# Patient Record
Sex: FEMALE | Race: WHITE | NOT HISPANIC OR LATINO
[De-identification: names, ages, dates, MRNs, and addresses within clinical notes are randomized per-mention and may not be internally consistent; named-entity substitution may affect disease eponyms.]

---

## 2017-08-10 ENCOUNTER — HOSPITAL (OUTPATIENT)
Dept: OTHER | Age: 72
End: 2017-08-10
Attending: INTERNAL MEDICINE

## 2021-09-17 ENCOUNTER — OFFICE VISIT (OUTPATIENT)
Dept: URBAN - METROPOLITAN AREA CLINIC 43 | Facility: CLINIC | Age: 76
End: 2021-09-17
Payer: MEDICARE

## 2021-09-17 DIAGNOSIS — H43.813 VITREOUS DEGENERATION, BILATERAL: ICD-10-CM

## 2021-09-17 DIAGNOSIS — H18.523 EPITHELIAL (JUVENILE) CORNEAL DYSTROPHY, BILATERAL: ICD-10-CM

## 2021-09-17 DIAGNOSIS — H17.9 CORNEAL SCAR: ICD-10-CM

## 2021-09-17 DIAGNOSIS — H25.12 AGE-RELATED NUCLEAR CATARACT, LEFT EYE: Primary | ICD-10-CM

## 2021-09-17 PROCEDURE — 99204 OFFICE O/P NEW MOD 45 MIN: CPT | Performed by: OPTOMETRIST

## 2021-09-17 PROCEDURE — 92134 CPTRZ OPH DX IMG PST SGM RTA: CPT | Performed by: OPTOMETRIST

## 2021-09-17 ASSESSMENT — INTRAOCULAR PRESSURE
OD: 16
OS: 18

## 2021-09-17 ASSESSMENT — KERATOMETRY
OD: 43.00
OS: 43.13

## 2021-09-17 ASSESSMENT — VISUAL ACUITY
OD: 20/20
OS: 20/20

## 2021-09-17 NOTE — IMPRESSION/PLAN
Impression: Vitreous degeneration, bilateral: H43.813. Plan: Warning signs of retinal tear and detachment discussed with patient. To return to clinic STAT if any change in symptoms consistent with RT or RD. 
NO VMT on mac oCT

## 2021-09-17 NOTE — IMPRESSION/PLAN
Impression: Age-related nuclear cataract, left eye: H25.12. Plan: No treatment currently recommended due to level of vision. Patient will monitor vision changes and contact us with any decrease in vision, will re-evaluate cataract on return visit.

## 2021-09-17 NOTE — IMPRESSION/PLAN
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  Plan: significant, recommend ATs QID OU

## 2021-09-17 NOTE — IMPRESSION/PLAN
Impression: Primary open-angle glaucoma, bilateral, indeterminate stage: H40.1134. Plan: transferring care here
moderate cupping, disc heme OS today
taking lumigan qhs OU, dorzolamide BID OU
IOP 16/18 today
switch to rocklatan qhs OU, cont.  Cosopt BID OU
return 3-4 weeks for HVF/IOP/pachs/OCT RNFL

## 2021-11-01 ENCOUNTER — OFFICE VISIT (OUTPATIENT)
Dept: URBAN - METROPOLITAN AREA CLINIC 43 | Facility: CLINIC | Age: 76
End: 2021-11-01
Payer: MEDICARE

## 2021-11-01 DIAGNOSIS — H40.1134 PRIMARY OPEN-ANGLE GLAUCOMA, BILATERAL, INDETERMINATE STAGE: Primary | ICD-10-CM

## 2021-11-01 PROCEDURE — 76514 ECHO EXAM OF EYE THICKNESS: CPT | Performed by: OPTOMETRIST

## 2021-11-01 PROCEDURE — 92133 CPTRZD OPH DX IMG PST SGM ON: CPT | Performed by: OPTOMETRIST

## 2021-11-01 PROCEDURE — 99213 OFFICE O/P EST LOW 20 MIN: CPT | Performed by: OPTOMETRIST

## 2021-11-01 PROCEDURE — 92083 EXTENDED VISUAL FIELD XM: CPT | Performed by: OPTOMETRIST

## 2021-11-01 RX ORDER — DORZOLAMIDE HYDROCHLORIDE AND TIMOLOL MALEATE 20; 5 MG/ML; MG/ML
SOLUTION/ DROPS OPHTHALMIC
Qty: 5 | Refills: 6 | Status: INACTIVE
Start: 2021-11-01 | End: 2021-12-06

## 2021-11-01 ASSESSMENT — INTRAOCULAR PRESSURE
OS: 14
OD: 12
OS: 15
OD: 14

## 2021-11-01 NOTE — IMPRESSION/PLAN
Impression: Primary open-angle glaucoma, bilateral, moderate stage: S94.1372. Plan: transferring care here
moderate cupping, disc heme OS 09/17/21 IOP today: on rocklatan qhs OU, cosopt BID OU pachs slightly thin OCT RNFL today: OD: no thinning, OS: inf thinning HVF today: reliable OU: OD: paracentral/central defect, OS: paracentral/central defects, inf and sup nasal step
cont current gtts, return 6 weeks for IOP/HVF10-2

## 2021-11-01 NOTE — IMPRESSION/PLAN
Impression: Primary open-angle glaucoma, bilateral, indeterminate stage: H40.1134. Plan: see other plan

## 2021-12-06 ENCOUNTER — OFFICE VISIT (OUTPATIENT)
Dept: URBAN - METROPOLITAN AREA CLINIC 43 | Facility: CLINIC | Age: 76
End: 2021-12-06
Payer: MEDICARE

## 2021-12-06 DIAGNOSIS — H40.1132 PRIMARY OPEN-ANGLE GLAUCOMA, BILATERAL, MODERATE STAGE: Primary | ICD-10-CM

## 2021-12-06 PROCEDURE — 99213 OFFICE O/P EST LOW 20 MIN: CPT | Performed by: OPTOMETRIST

## 2021-12-06 PROCEDURE — 92083 EXTENDED VISUAL FIELD XM: CPT | Performed by: OPTOMETRIST

## 2021-12-06 RX ORDER — DORZOLAMIDE HYDROCHLORIDE AND TIMOLOL MALEATE 20; 5 MG/ML; MG/ML
SOLUTION/ DROPS OPHTHALMIC
Qty: 5 | Refills: 6 | Status: INACTIVE
Start: 2021-12-06 | End: 2022-04-04

## 2021-12-06 RX ORDER — NETARSUDIL AND LATANOPROST OPHTHALMIC SOLUTION, 0.02%/0.005% .2; .05 MG/ML; MG/ML
SOLUTION/ DROPS OPHTHALMIC; TOPICAL
Qty: 2.5 | Refills: 6 | Status: INACTIVE
Start: 2021-12-06 | End: 2021-12-06

## 2021-12-06 RX ORDER — NETARSUDIL AND LATANOPROST OPHTHALMIC SOLUTION, 0.02%/0.005% .2; .05 MG/ML; MG/ML
SOLUTION/ DROPS OPHTHALMIC; TOPICAL
Qty: 2.5 | Refills: 6 | Status: INACTIVE
Start: 2021-12-06 | End: 2022-04-04

## 2021-12-06 ASSESSMENT — INTRAOCULAR PRESSURE
OS: 14
OD: 14

## 2022-04-04 ENCOUNTER — OFFICE VISIT (OUTPATIENT)
Dept: URBAN - METROPOLITAN AREA CLINIC 43 | Facility: CLINIC | Age: 77
End: 2022-04-04
Payer: MEDICARE

## 2022-04-04 PROCEDURE — 99213 OFFICE O/P EST LOW 20 MIN: CPT | Performed by: OPTOMETRIST

## 2022-04-04 RX ORDER — DORZOLAMIDE HYDROCHLORIDE AND TIMOLOL MALEATE 20; 5 MG/ML; MG/ML
SOLUTION/ DROPS OPHTHALMIC
Qty: 5 | Refills: 6 | Status: ACTIVE
Start: 2022-04-04

## 2022-04-04 RX ORDER — NETARSUDIL AND LATANOPROST OPHTHALMIC SOLUTION, 0.02%/0.005% .2; .05 MG/ML; MG/ML
SOLUTION/ DROPS OPHTHALMIC; TOPICAL
Qty: 2.5 | Refills: 6 | Status: ACTIVE
Start: 2022-04-04

## 2022-04-04 ASSESSMENT — INTRAOCULAR PRESSURE
OD: 14
OS: 14
OD: 13

## 2022-04-04 NOTE — IMPRESSION/PLAN
Impression: Primary open-angle glaucoma, bilateral, moderate stage: E81.4667. Plan: moderate cupping, disc heme OS 09/17/21 IOP today: 13/14 on rocklatan qhs OU, cosopt BID OU pachs slightly thin OCT RNFL 11/01/21 OD: no thinning, OS: inf thinning HVF10-2 12/06/21: reliable OU, OD: paracentral defect and small sup arcuate, OS: superior arcuate with paracentral defect
cont current gtts RTC 5 months for CE/VF/RNFL/MAC

## 2022-04-20 ENCOUNTER — OFFICE VISIT (OUTPATIENT)
Dept: URBAN - METROPOLITAN AREA CLINIC 44 | Facility: CLINIC | Age: 77
End: 2022-04-20
Payer: MEDICARE

## 2022-04-20 DIAGNOSIS — H40.032 ANATOMICAL NARROW ANGLE, LEFT EYE: ICD-10-CM

## 2022-04-20 DIAGNOSIS — H40.1132 PRIMARY OPEN-ANGLE GLAUCOMA, BILATERAL, MODERATE STAGE: Primary | ICD-10-CM

## 2022-04-20 PROCEDURE — 99205 OFFICE O/P NEW HI 60 MIN: CPT | Performed by: OPHTHALMOLOGY

## 2022-04-20 PROCEDURE — 92133 CPTRZD OPH DX IMG PST SGM ON: CPT | Performed by: OPHTHALMOLOGY

## 2022-04-20 PROCEDURE — 76514 ECHO EXAM OF EYE THICKNESS: CPT | Performed by: OPHTHALMOLOGY

## 2022-04-20 PROCEDURE — 92020 GONIOSCOPY: CPT | Performed by: OPHTHALMOLOGY

## 2022-04-20 PROCEDURE — 92083 EXTENDED VISUAL FIELD XM: CPT | Performed by: OPHTHALMOLOGY

## 2022-04-20 ASSESSMENT — INTRAOCULAR PRESSURE
OS: 20
OD: 14

## 2022-04-20 NOTE — IMPRESSION/PLAN
Impression: Primary open-angle glaucoma, bilateral, moderate stage: G64.5518. Plan: PT HAS BEEN TREATED FOR GLAUCOMA OU     GONIO :  O=SS  OS= ANT TM (04/20/2022)        PACHS:  534 OU (04/20/2022) S/P CE IOL OD 12/2019/YAG 2020, THE PT HAS NOT HAD CATARACT SURGERY IN THE LEFT EYE
PT WAS REFERRED BY [facility] DUE TO VISUAL FIELD DEFECTS OU
PT HAS BEEN TREATED IN THE PAST BY DR PETERSEN
PT DENIES FAMILY HX OF GLAUCOMA
PT DENIES SULFA ALLERGY 
PT DENIES LUNG DZ 
TARGET IOP MID TEENS OR LESS 
RECOMMEND : 
1. CONTINUE ROCKLATAN OU QPM(STARTED BY DR [facility]) - PT WAS ORIGINALLY RXD LUMIGAN BY DR Favio Davis 2. CONTINUE COSOPT OU BID - STARTED BY DR Favio Davis 3. RECOMMEND LPI OS 4/20/22, PT DEFERS 4/20/22 4. ON 4/20/22 THE PT WAS PROVIDED LITERATURE ON LPI 5.  THE PT WISHES TO DEFER LPI OS OVER THE SUMMER AND RETURN IN THE FALL OF 2022 FOR LPI OS IF IT HAS NOT BEEN DONE WHILE SHE IS OUTSIDE OF [place] OVER THE SUMMER

## 2022-04-20 NOTE — IMPRESSION/PLAN
Impression: Anatomical narrow angle, left eye: H40.032. Plan: PT IS AT RISK FOR  NAG  OS      GONIO GRADE:  TM OS 04/20/2022 REFERRED BY DR. Rafa Rivera 1. AGREE WITH DR. Rafa Rivera THAT PT IS A RISK FOR ANGLE CLOSURE OS 2. RECOMMEND LPI OS TO AVOID ANGLE CLOSURE. DISCUSSED RISKS VS BENEFITS OF LPI. PT IS AWARE THAT THERE IS A 20% CHANCE THAT THEY MAY REQUIRE ENLARGEMENT OF THE LPI TO CREATE AN IDEAL SIZED IRIDOTOMY. PT MAY  EXPERIENCE AN INFERIOR FLOATER FOLLOWING THE LPI PROCEDURE. THE PT IS AWARE THAT IF ANGLE CLOSURE OCCURS, THEY MAY EXPERIENCE  SIGNIFICANT VISION LOSS AND POSSIBLY COMPLETE LOSS OF ALL SIGHT IN THE AFFECTED EYE. 3. PT DEFERS LPI OS 04/20/2022 4. ON 04/20/2022 THE PT WAS GIVEN WRITTEN LITERATURE ON THE NATURE OF ANGLE CLOSURE 5. THE PT IS AWARE THAT THEY MAY REQUIRE LONG-TERM USE OF EYEDROP MEDICATIONS EVEN AFTER SUCCESSFUL LPI 6.  THE PT CAN RETURN FOR F/U 1-6 MONTHS OR SOONER IF PT WISHES TO UNDERGO LPI

## 2022-04-26 ENCOUNTER — APPOINTMENT (RX ONLY)
Dept: URBAN - METROPOLITAN AREA CLINIC 159 | Facility: CLINIC | Age: 77
Setting detail: DERMATOLOGY
End: 2022-04-26

## 2022-04-26 DIAGNOSIS — D18.0 HEMANGIOMA: ICD-10-CM

## 2022-04-26 DIAGNOSIS — D22 MELANOCYTIC NEVI: ICD-10-CM

## 2022-04-26 DIAGNOSIS — L73.8 OTHER SPECIFIED FOLLICULAR DISORDERS: ICD-10-CM

## 2022-04-26 DIAGNOSIS — L81.4 OTHER MELANIN HYPERPIGMENTATION: ICD-10-CM

## 2022-04-26 DIAGNOSIS — L82.1 OTHER SEBORRHEIC KERATOSIS: ICD-10-CM

## 2022-04-26 PROBLEM — D22.5 MELANOCYTIC NEVI OF TRUNK: Status: ACTIVE | Noted: 2022-04-26

## 2022-04-26 PROBLEM — D18.01 HEMANGIOMA OF SKIN AND SUBCUTANEOUS TISSUE: Status: ACTIVE | Noted: 2022-04-26

## 2022-04-26 PROCEDURE — ? COUNSELING

## 2022-04-26 PROCEDURE — 99203 OFFICE O/P NEW LOW 30 MIN: CPT

## 2022-04-26 ASSESSMENT — LOCATION DETAILED DESCRIPTION DERM
LOCATION DETAILED: LEFT INFERIOR LATERAL MALAR CHEEK
LOCATION DETAILED: LEFT RIB CAGE
LOCATION DETAILED: RIGHT RIB CAGE
LOCATION DETAILED: LEFT ANTERIOR PROXIMAL THIGH
LOCATION DETAILED: LEFT PROXIMAL DORSAL FOREARM
LOCATION DETAILED: SUPERIOR THORACIC SPINE
LOCATION DETAILED: RIGHT PROXIMAL DORSAL FOREARM
LOCATION DETAILED: RIGHT DISTAL DORSAL FOREARM
LOCATION DETAILED: INFERIOR MID FOREHEAD
LOCATION DETAILED: RIGHT LATERAL SUPERIOR CHEST
LOCATION DETAILED: LEFT INFERIOR UPPER BACK
LOCATION DETAILED: LEFT LATERAL SUPERIOR CHEST
LOCATION DETAILED: LEFT DISTAL DORSAL FOREARM
LOCATION DETAILED: LEFT LATERAL ABDOMEN
LOCATION DETAILED: RIGHT DISTAL POSTERIOR UPPER ARM
LOCATION DETAILED: RIGHT SUPERIOR LATERAL MIDBACK
LOCATION DETAILED: LEFT ANTERIOR DISTAL THIGH
LOCATION DETAILED: RIGHT DISTAL CALF
LOCATION DETAILED: STERNAL NOTCH
LOCATION DETAILED: RIGHT ANTERIOR DISTAL THIGH
LOCATION DETAILED: LEFT DISTAL PRETIBIAL REGION
LOCATION DETAILED: RIGHT INFERIOR CENTRAL MALAR CHEEK
LOCATION DETAILED: RIGHT DISTAL PRETIBIAL REGION
LOCATION DETAILED: RIGHT SUPERIOR UPPER BACK
LOCATION DETAILED: LEFT PROXIMAL POSTERIOR UPPER ARM
LOCATION DETAILED: RIGHT MID-UPPER BACK
LOCATION DETAILED: LEFT LATERAL UPPER BACK
LOCATION DETAILED: LEFT MEDIAL MALAR CHEEK
LOCATION DETAILED: LEFT ANTERIOR SHOULDER
LOCATION DETAILED: RIGHT ANTERIOR PROXIMAL THIGH
LOCATION DETAILED: RIGHT MEDIAL MALAR CHEEK
LOCATION DETAILED: RIGHT INFERIOR LATERAL MIDBACK
LOCATION DETAILED: LEFT SUPERIOR UPPER BACK
LOCATION DETAILED: LEFT DISTAL POSTERIOR THIGH
LOCATION DETAILED: RIGHT CLAVICULAR SKIN
LOCATION DETAILED: LEFT SUPERIOR PARIETAL SCALP

## 2022-04-26 ASSESSMENT — LOCATION SIMPLE DESCRIPTION DERM
LOCATION SIMPLE: RIGHT CHEEK
LOCATION SIMPLE: LEFT POSTERIOR THIGH
LOCATION SIMPLE: RIGHT PRETIBIAL REGION
LOCATION SIMPLE: LEFT THIGH
LOCATION SIMPLE: RIGHT CALF
LOCATION SIMPLE: LEFT CHEEK
LOCATION SIMPLE: SCALP
LOCATION SIMPLE: LEFT UPPER BACK
LOCATION SIMPLE: LEFT SHOULDER
LOCATION SIMPLE: LEFT PRETIBIAL REGION
LOCATION SIMPLE: RIGHT UPPER BACK
LOCATION SIMPLE: LEFT FOREARM
LOCATION SIMPLE: INFERIOR FOREHEAD
LOCATION SIMPLE: RIGHT CLAVICULAR SKIN
LOCATION SIMPLE: CHEST
LOCATION SIMPLE: RIGHT FOREARM
LOCATION SIMPLE: RIGHT THIGH
LOCATION SIMPLE: RIGHT POSTERIOR UPPER ARM
LOCATION SIMPLE: RIGHT LOWER BACK
LOCATION SIMPLE: ABDOMEN
LOCATION SIMPLE: UPPER BACK
LOCATION SIMPLE: LEFT POSTERIOR UPPER ARM

## 2022-04-26 ASSESSMENT — LOCATION ZONE DERM
LOCATION ZONE: TRUNK
LOCATION ZONE: ARM
LOCATION ZONE: LEG
LOCATION ZONE: FACE
LOCATION ZONE: SCALP

## 2022-10-21 ENCOUNTER — OFFICE VISIT (OUTPATIENT)
Dept: URBAN - METROPOLITAN AREA CLINIC 44 | Facility: CLINIC | Age: 77
End: 2022-10-21
Payer: MEDICARE

## 2022-10-21 DIAGNOSIS — H40.032 ANATOMICAL NARROW ANGLE, LEFT EYE: Primary | ICD-10-CM

## 2022-10-21 PROCEDURE — 92133 CPTRZD OPH DX IMG PST SGM ON: CPT | Performed by: OPHTHALMOLOGY

## 2022-10-21 PROCEDURE — 99213 OFFICE O/P EST LOW 20 MIN: CPT | Performed by: OPHTHALMOLOGY

## 2022-10-21 RX ORDER — DORZOLAMIDE HYDROCHLORIDE AND TIMOLOL MALEATE 20; 5 MG/ML; MG/ML
SOLUTION/ DROPS OPHTHALMIC
Qty: 15 | Refills: 3 | Status: ACTIVE
Start: 2022-10-21

## 2022-10-21 RX ORDER — PREDNISOLONE ACETATE 10 MG/ML
1 % SUSPENSION/ DROPS OPHTHALMIC
Qty: 15 | Refills: 0 | Status: ACTIVE
Start: 2022-10-21

## 2022-10-21 RX ORDER — NETARSUDIL AND LATANOPROST OPHTHALMIC SOLUTION, 0.02%/0.005% .2; .05 MG/ML; MG/ML
SOLUTION/ DROPS OPHTHALMIC; TOPICAL
Qty: 15 | Refills: 3 | Status: ACTIVE
Start: 2022-10-21

## 2022-10-21 ASSESSMENT — INTRAOCULAR PRESSURE
OS: 16
OD: 13

## 2022-10-21 NOTE — IMPRESSION/PLAN
Impression: Anatomical narrow angle, left eye: H40.032. Plan: PT IS AT RISK FOR  NAG  OS      GONIO :  ANT TM  OS 04/20/2022 REFERRED BY DR. Daniel Perez 1. CONTINUE ROCKLATAN OU QHS (STARTED BY DR. [facility] 09/27/21, SWITCHED FROM LUMIGAN ) 2. CONTINUE COSOPT OU BID (STARTED BY DR. PETERSEN ) 3. RECOMMEND LPI OS TO AVOID ANGLE CLOSURE. PT WISHES TO PROCEED 10/21/22
4. ON 04/20/2022 THE PT WAS GIVEN WRITTEN LITERATURE ON THE NATURE OF ANGLE CLOSURE 5. THE PT IS AWARE THAT THEY MAY REQUIRE LONG-TERM USE OF EYEDROP MEDICATIONS EVEN AFTER SUCCESSFUL LPI 6.  SCHEDULE LPI OS

## 2023-01-19 ENCOUNTER — OFFICE VISIT (OUTPATIENT)
Dept: URBAN - METROPOLITAN AREA CLINIC 44 | Facility: CLINIC | Age: 78
End: 2023-01-19
Payer: MEDICARE

## 2023-01-19 DIAGNOSIS — H25.12 AGE-RELATED NUCLEAR CATARACT, LEFT EYE: ICD-10-CM

## 2023-01-19 DIAGNOSIS — H40.032 ANATOMICAL NARROW ANGLE, LEFT EYE: Primary | ICD-10-CM

## 2023-01-19 DIAGNOSIS — H40.1111 PRIMARY OPEN-ANGLE GLAUCOMA, MILD STAGE, RIGHT EYE: ICD-10-CM

## 2023-01-19 PROCEDURE — 92083 EXTENDED VISUAL FIELD XM: CPT | Performed by: OPTOMETRIST

## 2023-01-19 PROCEDURE — 99213 OFFICE O/P EST LOW 20 MIN: CPT | Performed by: OPTOMETRIST

## 2023-01-19 ASSESSMENT — INTRAOCULAR PRESSURE
OD: 11
OD: 15
OS: 13
OS: 16

## 2023-01-19 NOTE — IMPRESSION/PLAN
Impression: Age-related nuclear cataract, left eye: H25.12.  Plan: PLAN: RTC for complete when cleared by glaucoma for DFE

## 2023-01-19 NOTE — IMPRESSION/PLAN
Impression: Anatomical narrow angle, left eye: H40.032. IOP 15,16
-VF OS with paracentral defect (VFI 93% 1/23) -PT IS AT RISK FOR  NAG  OS      GONIO :  ANT TM  OS 04/20/2022 Plan: PLAN: Eddye Delude OU QHS, COSOPT OU BID.   Will speak with Renetta Alvarez today to sched LPI OS

## 2023-01-19 NOTE — IMPRESSION/PLAN
Impression: Primary open-angle glaucoma, mild stage, right eye: H40.1111. =IOP 11.
-Vertical cupping . 75. Normal RNFL. Average 79
-VF#2 with no defects (VFI 99% 1/23) -Pach 534. Plan: PLAN: Continue with Rocklatan 1 gtt QHS, Cosopt BID.  RTC for complete + RNFL when cleared by Glaucoma for [facility]

## 2023-01-27 ENCOUNTER — SURGERY (OUTPATIENT)
Dept: URBAN - METROPOLITAN AREA SURGERY 19 | Facility: SURGERY | Age: 78
End: 2023-01-27
Payer: MEDICARE

## 2023-01-27 PROCEDURE — 66761 REVISION OF IRIS: CPT | Performed by: OPHTHALMOLOGY

## 2023-02-24 ENCOUNTER — OFFICE VISIT (OUTPATIENT)
Dept: URBAN - METROPOLITAN AREA CLINIC 44 | Facility: CLINIC | Age: 78
End: 2023-02-24
Payer: MEDICARE

## 2023-02-24 DIAGNOSIS — H40.032 ANATOMICAL NARROW ANGLE, LEFT EYE: Primary | ICD-10-CM

## 2023-02-24 PROCEDURE — 99213 OFFICE O/P EST LOW 20 MIN: CPT | Performed by: OPHTHALMOLOGY

## 2023-02-24 PROCEDURE — 92020 GONIOSCOPY: CPT | Performed by: OPHTHALMOLOGY

## 2023-02-24 ASSESSMENT — INTRAOCULAR PRESSURE
OD: 18
OS: 14

## 2023-02-24 NOTE — IMPRESSION/PLAN
Impression: Anatomical narrow angle, left eye: H40.032. Plan: PT IS AT RISK FOR  NAG  OS      GONIO : SS OU 02/24/2023 REFERRED BY DR. Waleska Mcnulty
S/P LPI OS 01/27/2023 S/P CE IOL OD 2018 1. CONTINUE ROCKLATAN OU QHS (STARTED BY DR. Brad Olea 09/27/21, SWITCHED FROM LUMIGAN ) 2. CONTINUE COSOPT OU BID (STARTED BY DR. PETERSEN ) 3. S/P LPI OS ARE PATENT AND THE PT IS DOING WELL 4. VISUAL FIELD 01/19/2023 5. SCHEDULE FOLLOW UP IOP CHECK IN  8-12 MONTHS AFTER PATIENT RETURNS FROM [place].

## 2023-03-24 ENCOUNTER — OFFICE VISIT (OUTPATIENT)
Dept: URBAN - METROPOLITAN AREA CLINIC 44 | Facility: CLINIC | Age: 78
End: 2023-03-24
Payer: MEDICARE

## 2023-03-24 DIAGNOSIS — H40.032 ANATOMICAL NARROW ANGLE, LEFT EYE: Primary | ICD-10-CM

## 2023-03-24 DIAGNOSIS — H43.813 VITREOUS DEGENERATION, BILATERAL: ICD-10-CM

## 2023-03-24 DIAGNOSIS — H40.1111 PRIMARY OPEN-ANGLE GLAUCOMA, MILD STAGE, RIGHT EYE: ICD-10-CM

## 2023-03-24 DIAGNOSIS — H25.12 AGE-RELATED NUCLEAR CATARACT, LEFT EYE: ICD-10-CM

## 2023-03-24 DIAGNOSIS — H04.123 TEAR FILM INSUFFICIENCY OF BILATERAL LACRIMAL GLANDS: ICD-10-CM

## 2023-03-24 DIAGNOSIS — H52.223 REGULAR ASTIGMATISM, BILATERAL: ICD-10-CM

## 2023-03-24 PROCEDURE — 92133 CPTRZD OPH DX IMG PST SGM ON: CPT | Performed by: OPTOMETRIST

## 2023-03-24 PROCEDURE — 99214 OFFICE O/P EST MOD 30 MIN: CPT | Performed by: OPTOMETRIST

## 2023-03-24 RX ORDER — BIMATOPROST 0.1 MG/ML
0.01 % SOLUTION/ DROPS OPHTHALMIC
Qty: 5 | Refills: 3 | Status: ACTIVE
Start: 2023-03-24

## 2023-03-24 ASSESSMENT — INTRAOCULAR PRESSURE
OD: 16
OS: 18

## 2023-03-24 ASSESSMENT — KERATOMETRY
OS: 42.63
OD: 42.50

## 2023-03-24 ASSESSMENT — VISUAL ACUITY
OD: 20/25
OS: 20/25

## 2023-03-24 NOTE — IMPRESSION/PLAN
Impression: Primary open-angle glaucoma, mild stage, right eye: H40.1111. =IOP 16
-Vertical cupping . 74. Normal RNFL. Average 77
-VF#2 with no defects (VFI 99% 1/23) -Pach 534.  Plan: See E53.662

## 2023-03-24 NOTE — IMPRESSION/PLAN
Impression: Anatomical narrow angle, left eye: H40.032. IOP 18
-Vertical cupping . 82 OS with RNFL loss (S) Average 68
-VF OS with paracentral defect (VFI 93% 1/23) -SP LPI 1/23 Plan: PLAN: Stop Rocklatan due to side effects(redness) Restart Lumigan OU QHS, and cont COSOPT OU BID.   RTC 7-8 months for complete + RNFL

## 2023-03-24 NOTE — IMPRESSION/PLAN
Impression: Tear film insufficiency of bilateral lacrimal glands: H04.123. Plan: PLAN: Recommend Lipid based tears to be used 3-4X daily if symptomatic. PRN if no symptoms. Observe condition and RTC if symptom's worsen.

## 2023-03-24 NOTE — IMPRESSION/PLAN
Impression: Age-related nuclear cataract, left eye: H25.12. Patient asymptomatic and comfortable with current level of vision. Plan: PLAN: Continue to observe condition patient would like to address this when she gets back from [place]. RTC 7-8 months for complete exam complete. RTC sooner if patient symptoms become worse.

## 2023-04-19 ENCOUNTER — APPOINTMENT (RX ONLY)
Dept: URBAN - METROPOLITAN AREA CLINIC 159 | Facility: CLINIC | Age: 78
Setting detail: DERMATOLOGY
End: 2023-04-19

## 2023-04-19 DIAGNOSIS — L82.1 OTHER SEBORRHEIC KERATOSIS: ICD-10-CM

## 2023-04-19 DIAGNOSIS — L81.4 OTHER MELANIN HYPERPIGMENTATION: ICD-10-CM

## 2023-04-19 DIAGNOSIS — D22 MELANOCYTIC NEVI: ICD-10-CM

## 2023-04-19 DIAGNOSIS — D18.0 HEMANGIOMA: ICD-10-CM

## 2023-04-19 PROBLEM — D22.5 MELANOCYTIC NEVI OF TRUNK: Status: ACTIVE | Noted: 2023-04-19

## 2023-04-19 PROBLEM — D18.01 HEMANGIOMA OF SKIN AND SUBCUTANEOUS TISSUE: Status: ACTIVE | Noted: 2023-04-19

## 2023-04-19 PROBLEM — D48.5 NEOPLASM OF UNCERTAIN BEHAVIOR OF SKIN: Status: ACTIVE | Noted: 2023-04-19

## 2023-04-19 PROCEDURE — ? BIOPSY BY SHAVE METHOD

## 2023-04-19 PROCEDURE — 11102 TANGNTL BX SKIN SINGLE LES: CPT

## 2023-04-19 PROCEDURE — 99213 OFFICE O/P EST LOW 20 MIN: CPT | Mod: 25

## 2023-04-19 PROCEDURE — ? COUNSELING

## 2023-04-19 ASSESSMENT — LOCATION DETAILED DESCRIPTION DERM
LOCATION DETAILED: LEFT LATERAL ABDOMEN
LOCATION DETAILED: LEFT INFERIOR LATERAL MALAR CHEEK
LOCATION DETAILED: RIGHT ANTERIOR PROXIMAL THIGH
LOCATION DETAILED: RIGHT LATERAL SUPERIOR CHEST
LOCATION DETAILED: RIGHT DISTAL PRETIBIAL REGION
LOCATION DETAILED: RIGHT DISTAL DORSAL FOREARM
LOCATION DETAILED: LEFT ANTERIOR DISTAL THIGH
LOCATION DETAILED: LEFT SUPERIOR PARIETAL SCALP
LOCATION DETAILED: RIGHT MID-UPPER BACK
LOCATION DETAILED: LEFT DISTAL PRETIBIAL REGION
LOCATION DETAILED: LEFT LATERAL UPPER BACK
LOCATION DETAILED: LEFT DISTAL DORSAL FOREARM
LOCATION DETAILED: RIGHT SUPERIOR UPPER BACK
LOCATION DETAILED: RIGHT DISTAL CALF
LOCATION DETAILED: RIGHT DISTAL POSTERIOR UPPER ARM
LOCATION DETAILED: LEFT DISTAL POSTERIOR THIGH
LOCATION DETAILED: LEFT SUPERIOR UPPER BACK
LOCATION DETAILED: LEFT INFERIOR UPPER BACK
LOCATION DETAILED: RIGHT RIB CAGE
LOCATION DETAILED: LEFT ANTERIOR PROXIMAL THIGH
LOCATION DETAILED: SUPERIOR THORACIC SPINE
LOCATION DETAILED: LEFT PROXIMAL POSTERIOR UPPER ARM
LOCATION DETAILED: RIGHT ANTERIOR DISTAL THIGH
LOCATION DETAILED: RIGHT CLAVICULAR SKIN
LOCATION DETAILED: RIGHT INFERIOR LATERAL MIDBACK
LOCATION DETAILED: RIGHT INFERIOR CENTRAL MALAR CHEEK
LOCATION DETAILED: RIGHT PROXIMAL DORSAL FOREARM
LOCATION DETAILED: LEFT LATERAL SUPERIOR CHEST
LOCATION DETAILED: RIGHT SUPERIOR LATERAL MIDBACK
LOCATION DETAILED: STERNAL NOTCH
LOCATION DETAILED: LEFT RIB CAGE
LOCATION DETAILED: LEFT PROXIMAL DORSAL FOREARM
LOCATION DETAILED: LEFT ANTERIOR SHOULDER

## 2023-04-19 ASSESSMENT — LOCATION SIMPLE DESCRIPTION DERM
LOCATION SIMPLE: LEFT POSTERIOR THIGH
LOCATION SIMPLE: RIGHT UPPER BACK
LOCATION SIMPLE: LEFT SHOULDER
LOCATION SIMPLE: RIGHT CHEEK
LOCATION SIMPLE: CHEST
LOCATION SIMPLE: UPPER BACK
LOCATION SIMPLE: RIGHT CLAVICULAR SKIN
LOCATION SIMPLE: LEFT POSTERIOR UPPER ARM
LOCATION SIMPLE: LEFT THIGH
LOCATION SIMPLE: LEFT UPPER BACK
LOCATION SIMPLE: RIGHT CALF
LOCATION SIMPLE: RIGHT PRETIBIAL REGION
LOCATION SIMPLE: LEFT CHEEK
LOCATION SIMPLE: RIGHT POSTERIOR UPPER ARM
LOCATION SIMPLE: LEFT FOREARM
LOCATION SIMPLE: RIGHT FOREARM
LOCATION SIMPLE: RIGHT THIGH
LOCATION SIMPLE: RIGHT LOWER BACK
LOCATION SIMPLE: SCALP
LOCATION SIMPLE: ABDOMEN
LOCATION SIMPLE: LEFT PRETIBIAL REGION

## 2023-04-19 ASSESSMENT — LOCATION ZONE DERM
LOCATION ZONE: ARM
LOCATION ZONE: SCALP
LOCATION ZONE: TRUNK
LOCATION ZONE: LEG
LOCATION ZONE: FACE

## 2023-12-07 ENCOUNTER — OFFICE VISIT (OUTPATIENT)
Dept: URBAN - METROPOLITAN AREA CLINIC 44 | Facility: CLINIC | Age: 78
End: 2023-12-07
Payer: MEDICARE

## 2023-12-07 DIAGNOSIS — H25.12 AGE-RELATED NUCLEAR CATARACT, LEFT EYE: ICD-10-CM

## 2023-12-07 DIAGNOSIS — H40.1111 PRIMARY OPEN-ANGLE GLAUCOMA, MILD STAGE, RIGHT EYE: ICD-10-CM

## 2023-12-07 DIAGNOSIS — Z96.1 PRESENCE OF INTRAOCULAR LENS: ICD-10-CM

## 2023-12-07 DIAGNOSIS — H40.032 ANATOMICAL NARROW ANGLE, LEFT EYE: Primary | ICD-10-CM

## 2023-12-07 PROCEDURE — 92133 CPTRZD OPH DX IMG PST SGM ON: CPT | Performed by: OPTOMETRIST

## 2023-12-07 PROCEDURE — 99214 OFFICE O/P EST MOD 30 MIN: CPT | Performed by: OPTOMETRIST

## 2023-12-07 ASSESSMENT — VISUAL ACUITY
OD: 20/30
OS: 20/25

## 2023-12-07 ASSESSMENT — INTRAOCULAR PRESSURE
OS: 11
OD: 12

## 2023-12-07 ASSESSMENT — KERATOMETRY
OD: 42.88
OS: 43.38

## 2024-05-13 ENCOUNTER — OFFICE VISIT (OUTPATIENT)
Dept: URBAN - METROPOLITAN AREA CLINIC 44 | Facility: CLINIC | Age: 79
End: 2024-05-13
Payer: MEDICARE

## 2024-05-13 DIAGNOSIS — H40.1111 PRIMARY OPEN-ANGLE GLAUCOMA, MILD STAGE, RIGHT EYE: ICD-10-CM

## 2024-05-13 DIAGNOSIS — H40.032 ANATOMICAL NARROW ANGLE, LEFT EYE: Primary | ICD-10-CM

## 2024-05-13 PROCEDURE — 99213 OFFICE O/P EST LOW 20 MIN: CPT | Performed by: OPTOMETRIST

## 2024-05-13 PROCEDURE — 92083 EXTENDED VISUAL FIELD XM: CPT | Performed by: OPTOMETRIST

## 2024-05-13 ASSESSMENT — INTRAOCULAR PRESSURE
OS: 11
OD: 13

## 2024-10-22 ENCOUNTER — OFFICE VISIT (OUTPATIENT)
Dept: URBAN - METROPOLITAN AREA CLINIC 44 | Facility: CLINIC | Age: 79
End: 2024-10-22
Payer: MEDICARE

## 2024-10-22 DIAGNOSIS — H40.032 ANATOMICAL NARROW ANGLE, LEFT EYE: ICD-10-CM

## 2024-10-22 DIAGNOSIS — H40.1111 PRIMARY OPEN-ANGLE GLAUCOMA, MILD STAGE, RIGHT EYE: Primary | ICD-10-CM

## 2024-10-22 DIAGNOSIS — H18.523 EPITHELIAL (JUVENILE) CORNEAL DYSTROPHY, BILATERAL: ICD-10-CM

## 2024-10-22 DIAGNOSIS — H25.12 AGE-RELATED NUCLEAR CATARACT, LEFT EYE: ICD-10-CM

## 2024-10-22 PROCEDURE — 92133 CPTRZD OPH DX IMG PST SGM ON: CPT | Performed by: OPTOMETRIST

## 2024-10-22 PROCEDURE — 92014 COMPRE OPH EXAM EST PT 1/>: CPT | Performed by: OPTOMETRIST

## 2024-10-22 ASSESSMENT — VISUAL ACUITY
OD: 20/20
OS: 20/20

## 2024-10-22 ASSESSMENT — INTRAOCULAR PRESSURE
OS: 17
OD: 16

## 2024-10-22 ASSESSMENT — KERATOMETRY
OS: 43.13
OD: 42.25

## 2025-04-28 ENCOUNTER — OFFICE VISIT (OUTPATIENT)
Dept: URBAN - METROPOLITAN AREA CLINIC 44 | Facility: CLINIC | Age: 80
End: 2025-04-28
Payer: MEDICARE

## 2025-04-28 DIAGNOSIS — H40.032 ANATOMICAL NARROW ANGLE, LEFT EYE: Primary | ICD-10-CM

## 2025-04-28 DIAGNOSIS — H40.1111 PRIMARY OPEN-ANGLE GLAUCOMA, MILD STAGE, RIGHT EYE: ICD-10-CM

## 2025-04-28 PROCEDURE — 92083 EXTENDED VISUAL FIELD XM: CPT | Performed by: OPTOMETRIST

## 2025-04-28 PROCEDURE — 92014 COMPRE OPH EXAM EST PT 1/>: CPT | Performed by: OPTOMETRIST

## 2025-04-28 ASSESSMENT — INTRAOCULAR PRESSURE
OS: 13
OD: 13